# Patient Record
Sex: MALE | Race: ASIAN | NOT HISPANIC OR LATINO
[De-identification: names, ages, dates, MRNs, and addresses within clinical notes are randomized per-mention and may not be internally consistent; named-entity substitution may affect disease eponyms.]

---

## 2023-04-27 PROBLEM — Z00.00 ENCOUNTER FOR PREVENTIVE HEALTH EXAMINATION: Status: ACTIVE | Noted: 2023-04-27

## 2023-05-01 ENCOUNTER — NON-APPOINTMENT (OUTPATIENT)
Age: 53
End: 2023-05-01

## 2023-05-17 ENCOUNTER — RESULT REVIEW (OUTPATIENT)
Age: 53
End: 2023-05-17

## 2023-05-17 ENCOUNTER — OUTPATIENT (OUTPATIENT)
Dept: OUTPATIENT SERVICES | Facility: HOSPITAL | Age: 53
LOS: 1 days | Discharge: HOME | End: 2023-05-17
Payer: MEDICAID

## 2023-05-17 ENCOUNTER — APPOINTMENT (OUTPATIENT)
Dept: GASTROENTEROLOGY | Facility: HOSPITAL | Age: 53
End: 2023-05-17

## 2023-05-17 VITALS
SYSTOLIC BLOOD PRESSURE: 123 MMHG | OXYGEN SATURATION: 99 % | DIASTOLIC BLOOD PRESSURE: 73 MMHG | HEART RATE: 59 BPM | TEMPERATURE: 97 F | RESPIRATION RATE: 16 BRPM

## 2023-05-17 PROCEDURE — 88305 TISSUE EXAM BY PATHOLOGIST: CPT

## 2023-05-17 PROCEDURE — 43254 EGD ENDO MUCOSAL RESECTION: CPT | Mod: 59

## 2023-05-17 PROCEDURE — 43254 EGD ENDO MUCOSAL RESECTION: CPT

## 2023-05-17 PROCEDURE — 43270 EGD LESION ABLATION: CPT

## 2023-05-17 PROCEDURE — 88305 TISSUE EXAM BY PATHOLOGIST: CPT | Mod: 26

## 2023-05-24 LAB — SURGICAL PATHOLOGY STUDY: SIGNIFICANT CHANGE UP

## 2023-11-21 NOTE — PACU DISCHARGE NOTE - NS MD DISCHARGE NOTE DISCHARGE
Medical screening examination initiated.  I have conducted a focused provider triage encounter, findings are as follows:    Brief history of present illness:  3 months low back pain radiating right greater than left buttock.  No red flags    Vitals:    11/21/23 1634 11/21/23 1635   BP:  (!) 151/83   Pulse: 102    Resp: 20    Temp: 98.6 °F (37 °C)    SpO2: 97%    Weight:  88.9 kg (196 lb)       Pertinent physical exam:  Nontoxic, well-appearing    Brief workup plan:  UA    Preliminary workup initiated; this workup will be continued and followed by the physician or advanced practice provider that is assigned to the patient when roomed.   Home

## 2023-12-04 ENCOUNTER — APPOINTMENT (OUTPATIENT)
Dept: UROLOGY | Facility: CLINIC | Age: 53
End: 2023-12-04
Payer: MEDICAID

## 2023-12-04 VITALS
SYSTOLIC BLOOD PRESSURE: 113 MMHG | RESPIRATION RATE: 17 BRPM | HEART RATE: 68 BPM | TEMPERATURE: 97.5 F | BODY MASS INDEX: 22.43 KG/M2 | OXYGEN SATURATION: 98 % | WEIGHT: 148 LBS | DIASTOLIC BLOOD PRESSURE: 71 MMHG | HEIGHT: 68 IN

## 2023-12-04 DIAGNOSIS — N20.0 CALCULUS OF KIDNEY: ICD-10-CM

## 2023-12-04 PROCEDURE — 99203 OFFICE O/P NEW LOW 30 MIN: CPT

## 2024-06-03 ENCOUNTER — APPOINTMENT (OUTPATIENT)
Dept: UROLOGY | Facility: CLINIC | Age: 54
End: 2024-06-03

## 2025-05-12 ENCOUNTER — APPOINTMENT (OUTPATIENT)
Dept: UROLOGY | Facility: CLINIC | Age: 55
End: 2025-05-12

## 2025-05-15 NOTE — PACU DISCHARGE NOTE - AIRWAY PATENCY:
Skinny called requesting a refill of the below medication which has been pended for you:     Requested Prescriptions     Pending Prescriptions Disp Refills    albuterol sulfate HFA (PROVENTIL;VENTOLIN;PROAIR) 108 (90 Base) MCG/ACT inhaler [Pharmacy Med Name: ALBUTEROL (PROAIR) HFA INHALER 90MCG] 25.5 g 3     Sig: USE 2 INHALATIONS EVERY 6 HOURS AS NEEDED FOR WHEEZING OR SHORTNESS OF BREATH       Last Appointment Date: 2/10/2025  Next Appointment Date: 6/6/2025    Allergies   Allergen Reactions    Diclofenac Other (See Comments)     urticaria    Zocor [Simvastatin] Other (See Comments)     Patient unable to recall      
Satisfactory

## (undated) DEVICE — FORCEP ELECTROSURGICAL HEMOSTATIC 2.75MM X 165MM DISP

## (undated) DEVICE — ATTACH DISTAL

## (undated) DEVICE — Device

## (undated) DEVICE — SNARE ENDO POLY CAPTIVATOR II 33MM

## (undated) DEVICE — ELCTR GROUNDING PAD ADULT COVIDIEN

## (undated) DEVICE — KNIFE DUAL J LOWER